# Patient Record
(demographics unavailable — no encounter records)

---

## 2025-06-02 NOTE — DATA REVIEWED
[FreeTextEntry1] : 6/2/2025 PVR Rt CUONG 0.64; Rt TBI 0.17; Rt toe pressure 30 Lt CUONG 0.66; Lt TBI 0.14; Lt toe pressure 25

## 2025-06-02 NOTE — ASSESSMENT
[FreeTextEntry1] : Impression - small AAA, PAD, bilateral shin blisters x 1 week  seen and examined with Dr. Thakkar  Plan Foot care and protection, exercise regimen Local wound care with bacitracin antibiotic ointment to bilateral shin blisters Continue asa, eliquis, statin and cilostazol Return to office in 6 months December 2025 to evaluate bilateral lower extremities with PVR Return to office in 1 month July 2025 to evaluate bilateral leg wounds [Arterial/Venous Disease] : arterial/venous disease [Medication Management] : medication management [Foot care/Footwear] : foot care/footwear [Ulcer Care] : ulcer care

## 2025-06-02 NOTE — HISTORY OF PRESENT ILLNESS
[FreeTextEntry1] : 94 yo male presents to the office to evaluate bilateral lower extremities. History of PAD, venous insufficiency and small aortic aneurysm. Last intervention was RLE angiogram 1/2023 which showed multilevel disease including SFA occlusion, popliteal occlusion, occlusive disease and PT runoff. No good options for revascularization. Reports bilateral shin blisters x 1 week. States his legs were swollen the past week but now the swelling is better. He denies drainage, redness fever or chills.  He denies claudication or rest pain. Denies wounds on his feet or toes. His hips and knees hurt when he walks. Uses a cane to walk. He takes asa, eliquis, cilostazol 50 mg BID and atorvastatin.

## 2025-06-02 NOTE — PHYSICAL EXAM
[2+] : left 2+ [0] : left 0 [Ankle Swelling (On Exam)] : present [Ankle Swelling Bilaterally] : bilaterally  [Ankle Swelling On The Right] : mild [Skin Ulcer] : ulcer [Alert] : alert [Oriented to Person] : oriented to person [Oriented to Place] : oriented to place [Oriented to Time] : oriented to time [Calm] : calm [de-identified] : NAD [de-identified] : NCAT [de-identified] : unlabored breathing [FreeTextEntry1] : bilateral lower extremities soft, warm and nontender mild bilateral leg edema rle shin blister 0.5 cm lle shin blister 1.2 cm no erythema or drainage no signs of infection